# Patient Record
Sex: FEMALE | Race: BLACK OR AFRICAN AMERICAN | Employment: FULL TIME | URBAN - METROPOLITAN AREA
[De-identification: names, ages, dates, MRNs, and addresses within clinical notes are randomized per-mention and may not be internally consistent; named-entity substitution may affect disease eponyms.]

---

## 2017-01-09 RX ORDER — OMEPRAZOLE 20 MG/1
CAPSULE, DELAYED RELEASE ORAL
Qty: 180 CAP | Refills: 0 | Status: SHIPPED | OUTPATIENT
Start: 2017-01-09 | End: 2017-02-22 | Stop reason: SDUPTHER

## 2017-02-13 ENCOUNTER — OFFICE VISIT (OUTPATIENT)
Dept: INTERNAL MEDICINE CLINIC | Age: 42
End: 2017-02-13

## 2017-02-13 ENCOUNTER — TELEPHONE (OUTPATIENT)
Dept: INTERNAL MEDICINE CLINIC | Age: 42
End: 2017-02-13

## 2017-02-13 VITALS
RESPIRATION RATE: 16 BRPM | HEIGHT: 63 IN | DIASTOLIC BLOOD PRESSURE: 68 MMHG | OXYGEN SATURATION: 98 % | TEMPERATURE: 98.6 F | BODY MASS INDEX: 46.49 KG/M2 | SYSTOLIC BLOOD PRESSURE: 122 MMHG | HEART RATE: 84 BPM | WEIGHT: 262.4 LBS

## 2017-02-13 DIAGNOSIS — E11.9 TYPE 2 DIABETES MELLITUS WITHOUT COMPLICATION, UNSPECIFIED LONG TERM INSULIN USE STATUS: Primary | ICD-10-CM

## 2017-02-13 DIAGNOSIS — Z76.89 ENCOUNTER TO ESTABLISH CARE: ICD-10-CM

## 2017-02-13 DIAGNOSIS — E66.01 MORBID OBESITY, UNSPECIFIED OBESITY TYPE (HCC): ICD-10-CM

## 2017-02-13 NOTE — PROGRESS NOTES
Nando Ulloa is a  37 y.o. female presents for visit. Chief Complaint   Patient presents with    New Patient     need A1 c checked and has pain in tooth and cannot sleep, seeing a dentist on Wednesday if A1c is checked. HPI  Previously followed by Dr. Jasmyne Kinney and here to establish care with new pcp. Appointment with Oral surgeon on Wednesday for dental implant and needs HgA1c before the procedure. Intermittent dental pain pain. Otherwise no complaints. Starting clindamycin today. Review of Systems   Constitutional: Negative for chills and fever. Cardiovascular: Negative for chest pain and palpitations. Gastrointestinal: Negative for nausea. Patient Active Problem List    Diagnosis Date Noted    Diabetes ([facility]     GERD (gastroesophageal reflux disease)     High cholesterol     Plantar fasciitis      Past Medical History   Diagnosis Date    Diabetes ([facility]     GERD (gastroesophageal reflux disease)     High cholesterol     Plantar fasciitis      bilateral feet    S/P laparoscopic sleeve gastrectomy 2013      Past Surgical History   Procedure Laterality Date    Hx cholecystectomy  1998    Hx tonsillectomy  2005        Social History   Substance Use Topics    Smoking status: Never Smoker    Smokeless tobacco: Never Used    Alcohol use No      Social History     Social History Narrative     Family History   Problem Relation Age of Onset    Other Mother      septic    Diabetes Mother     Hypertension Mother     Heart Disease Mother     Diabetes Father     Heart Disease Father     Hypertension Father     Other Father      PVD    Cancer Paternal Aunt      OVARIAN    Cancer Paternal Aunt      COLON      Prior to Admission medications    Medication Sig Start Date End Date Taking?  Authorizing Provider   omeprazole (PRILOSEC) 20 mg capsule TAKE ONE CAPSULE BY MOUTH TWICE DAILY 1/9/17  Yes Carolyn MD Sagrario   metFORMIN ER (GLUCOPHAGE XR) 500 mg tablet Take 1 Tab by mouth daily (with dinner). 3/9/16  Yes Merrill Lynn MD      Allergies   Allergen Reactions    Pcn [Penicillins] Hives          Visit Vitals    /68 (BP 1 Location: Left arm, BP Patient Position: Sitting)    Pulse 84    Temp 98.6 °F (37 °C) (Oral)    Resp 16    Ht 5' 3\" (1.6 m)    Wt 262 lb 6.4 oz (119 kg)    SpO2 98%    BMI 46.48 kg/m2     Physical Exam   Constitutional: She is oriented to person, place, and time. She appears well-developed and well-nourished. HENT:   Head: Normocephalic and atraumatic. Mouth/Throat:       Right lateral incisor broken. No signs of infection. Cardiovascular: Normal rate, regular rhythm and normal heart sounds. Pulmonary/Chest: Effort normal and breath sounds normal.   Abdominal: Soft. Neurological: She is alert and oriented to person, place, and time. Skin: Skin is warm and dry. Psychiatric: She has a normal mood and affect. Her behavior is normal.   Vitals reviewed. This note will not be viewable in [address] ASSESSMENT AND PLAN:      ICD-10-CM ICD-9-CM    1. Type 2 diabetes mellitus without complication, unspecified long term insulin use status ([facility]) E11.9 250.00 HEMOGLOBIN A1C WITH EAG   2. Encounter to establish care Z76.89     3. Morbid obesity, unspecified obesity type ([facility] E66.01 278.01            Follow-up Disposition:  Return in about 2 weeks (around 2/27/2017), for FULL Phyisal Exam, Health Maintenance. Disclaimer:  Advised her to call back or return to office if symptoms worsen/change/persist.    She was given an after visit summary which includes diagnoses, current medications, & vitals. Discussed patient instructions and advised to read to all patient instructions regarding care. She expressed understanding with the diagnosis and plan.

## 2017-02-13 NOTE — PROGRESS NOTES
Chief Complaint   Patient presents with    New Patient     need A1 c checked and has pain in tooth and cannot sleep, seeing a dentist on Wednesday if A1c is checked.       Patient needs a1c as she is scheduled to have surgery for the tooth and needs the information for the dentist.  Dr Js Marcos, [phone] phone number

## 2017-02-13 NOTE — TELEPHONE ENCOUNTER
Spoke with pt earlier today after reviewing her chart prior to her appt. She was placed as a Ha1c check but she has never been seen by our office. She is a pt of Dr. Ahmet Salgado and [facility]. Called pt to see if she was planning on switching practices. After speaking with pt she stated she has no intention of switching offices, she stated that Dr. Ahmet Salgado office was unalbe to help her as she need a HA1C checked asap, she has emergent dental work on Wed and they will not do without blood work. I see no notes from office but pt stated they can not do blood work until she is seen. The first visit they could give her was the 16 of this month but she is to see dentist on 15. This is when she called our office. Called two times to Dr. Ahmet Salgado office,  Was unable to get someone on phone first call and second call left message with nurse to return call as we were unable to get her on the phone.  aware along with NP who is to see pt today. Will await call back from office but if no call back we will see pt. This writer f/u with pt with plan of care.

## 2017-02-14 LAB
EST. AVERAGE GLUCOSE BLD GHB EST-MCNC: 146 MG/DL
HBA1C MFR BLD: 6.7 % (ref 4.8–5.6)

## 2017-02-14 NOTE — PROGRESS NOTES
Hemoglobin A1c 6.7 stable since last year. Please call with questions or can discuss at appointment on 2/28/17. Left voicemail. Please call again. Patient needs result before dental procedure tomorrow.

## 2017-02-22 RX ORDER — OMEPRAZOLE 20 MG/1
CAPSULE, DELAYED RELEASE ORAL
Qty: 180 CAP | Refills: 0 | Status: SHIPPED | OUTPATIENT
Start: 2017-02-22 | End: 2017-04-25 | Stop reason: SDUPTHER

## 2017-04-25 ENCOUNTER — OFFICE VISIT (OUTPATIENT)
Dept: INTERNAL MEDICINE CLINIC | Age: 42
End: 2017-04-25

## 2017-04-25 VITALS
RESPIRATION RATE: 17 BRPM | HEIGHT: 63 IN | SYSTOLIC BLOOD PRESSURE: 115 MMHG | DIASTOLIC BLOOD PRESSURE: 69 MMHG | OXYGEN SATURATION: 99 % | TEMPERATURE: 97.4 F | HEART RATE: 69 BPM

## 2017-04-25 DIAGNOSIS — K21.9 GASTROESOPHAGEAL REFLUX DISEASE WITHOUT ESOPHAGITIS: ICD-10-CM

## 2017-04-25 DIAGNOSIS — E78.00 HIGH CHOLESTEROL: ICD-10-CM

## 2017-04-25 DIAGNOSIS — E11.9 TYPE 2 DIABETES MELLITUS WITHOUT COMPLICATION, UNSPECIFIED LONG TERM INSULIN USE STATUS: Primary | ICD-10-CM

## 2017-04-25 RX ORDER — CEPHALEXIN 250 MG/1
CAPSULE ORAL
COMMUNITY
Start: 2017-04-20 | End: 2017-11-13 | Stop reason: ALTCHOICE

## 2017-04-25 RX ORDER — CLINDAMYCIN HYDROCHLORIDE 150 MG/1
CAPSULE ORAL
COMMUNITY
Start: 2017-02-13 | End: 2017-04-25 | Stop reason: ALTCHOICE

## 2017-04-25 RX ORDER — OMEPRAZOLE 20 MG/1
20 CAPSULE, DELAYED RELEASE ORAL DAILY
Qty: 180 CAP | Refills: 0 | Status: SHIPPED | OUTPATIENT
Start: 2017-04-25 | End: 2017-04-28 | Stop reason: SDUPTHER

## 2017-04-25 RX ORDER — OXYCODONE AND ACETAMINOPHEN 5; 325 MG/1; MG/1
TABLET ORAL
Refills: 0 | COMMUNITY
Start: 2017-02-15 | End: 2018-01-16 | Stop reason: ALTCHOICE

## 2017-04-25 RX ORDER — METFORMIN HYDROCHLORIDE 500 MG/1
500 TABLET, EXTENDED RELEASE ORAL
Qty: 30 TAB | Refills: 11 | Status: SHIPPED | OUTPATIENT
Start: 2017-04-25

## 2017-04-25 RX ORDER — SULFAMETHOXAZOLE AND TRIMETHOPRIM 800; 160 MG/1; MG/1
TABLET ORAL
COMMUNITY
Start: 2017-04-20 | End: 2017-11-13 | Stop reason: ALTCHOICE

## 2017-04-25 NOTE — PROGRESS NOTES
SPORTS MEDICINE AND PRIMARY CARE  Blanca Joyce MD, [address] [address]  Phone:  [phone]  Fax: [phone]      Chief Complaint   Patient presents with    Physical     yearly exam          SUBECTIVE:    Jose Johns is a 37 y.o. female Patient returns today ambulatory, alert and appropriate and has the capacity to give an accurate history. She has a known history of diabetes, GERD, dyslipidemia and is seen for evaluation. Since we last saw her she was seen by Nurse Practitioner Snehal Ugarte on 2/13/17. Patient returns today stating that since we last saw her she was seen at [facility] for an abscess on her upper inner thigh which was I&D by [facility] and the packing has been removed. Other new complaints are denied except she states \"I am fat. \"          Current Outpatient Prescriptions   Medication Sig Dispense Refill    cephALEXin (KEFLEX) 250 mg capsule       trimethoprim-sulfamethoxazole (BACTRIM DS, SEPTRA DS) 160-800 mg per tablet       omeprazole (PRILOSEC) 20 mg capsule TAKE ONE CAPSULE BY MOUTH TWICE DAILY 180 Cap 0    metFORMIN ER (GLUCOPHAGE XR) 500 mg tablet Take 1 Tab by mouth daily (with dinner). 30 Tab 11    oxyCODONE-acetaminophen (PERCOCET) 5-325 mg per tablet TAKE 1 TO 2 TABLETS BY MOUTH EVERY 4 TO 6 HOURS AS NEEDED FOR PAIN  0     Past Medical History:   Diagnosis Date    Diabetes ([address]     GERD (gastroesophageal reflux disease)     High cholesterol     Plantar fasciitis     bilateral feet    S/P laparoscopic sleeve gastrectomy 2013     Past Surgical History:   Procedure Laterality Date    HX CHOLECYSTECTOMY  1998    HX TONSILLECTOMY  2005     Allergies   Allergen Reactions    Pcn [Penicillins] Hives       REVIEW OF SYSTEMS:   No chest pain. No shortness of breath.          Social History     Social History    Marital status: SINGLE     Spouse name: N/A    Number of children: N/A    Years of education: N/A     Social History Main Topics    Smoking status: Never Smoker    Smokeless tobacco: Never Used    Alcohol use No    Drug use: None    Sexual activity: Not Asked     Other Topics Concern    None     Social History Narrative    Habits: Does not smoke or drink. Does not do drugs.          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 She has two children, 21 and 17. Patient is .  is unfortunately incarcerated. The children are with her.         Family History: Father is 61 with diabetes and hypertension. Mother  at age 64 with two heart attacks, diabetes, and hypertension. Reason for death however was sepsis and multifocal pneumonia. She has five siblings, alive and well.   r  Family History   Problem Relation Age of Onset    Other Mother      septic    Diabetes Mother     Hypertension Mother     Heart Disease Mother     Diabetes Father     Heart Disease Father     Hypertension Father     Other Father      PVD    Cancer Paternal Aunt      OVARIAN    Cancer Paternal Aunt      COLON       OBJECTIVE:  Visit Vitals    /69 (BP 1 Location: Left arm, BP Patient Position: Sitting)    Pulse 69    Temp 97.4 °F (36.3 °C) (Oral)    Resp 17    Ht 5' 3\" (1.6 m)    SpO2 99%     ENT: perrla,  eom intact  NECK: supple. Thyroid normal  CHEST: clear to ascultation and percussion   HEART: regular rate and rhythm  ABD: soft, bowel sounds active  EXTREMITIES: no edema, pulse 1+Foot exam is unremarkable. No lesions. Sensation is intact to fine filament. Pulses are intact. Office Visit on 2017   Component Date Value Ref Range Status    Hemoglobin A1c 2017 6.7* 4.8 - 5.6 % Final    Comment:          Pre-diabetes: 5.7 - 6.4           Diabetes: >6.4           Glycemic control for adults with diabetes: <7.0      Estimated average glucose 2017 146  mg/dL Final          ASSESSMENT:  1.  Type 2 diabetes mellitus without complication, unspecified long term insulin use status    2. Gastroesophageal reflux disease without esophagitis    3. High cholesterol      Patient's medical status is generally stable. Blood pressure control is excellent and exactly where it should be. We are a little disappointed that she was obviously when she first saw us today that her BMI represents a ten pound weight gain since we last saw her. She plans actively to attack the food intake. She is aware that stressors can aggravate this issue. We will check her thyroid today. We reinforce to her that she has no thyroid studies although on exam her thyroid is at the upper limits of normal on palpation. She has an elliptical at home. She is working at home. We certainly encourage her to do some type of physical activity for 30-45 minutes five days a week and she agrees to do that. She will return to see us in about six months. We will send her the results of our studies. PLAN:  .  Orders Placed This Encounter    MICROALBUMIN, UR, RAND W/ MICROALBUMIN/CREA RATIO    LIPID PANEL    CBC WITH AUTOMATED DIFF    METABOLIC PANEL, COMPREHENSIVE    URINALYSIS W/ RFLX MICROSCOPIC    TSH 3RD GENERATION    HEMOGLOBIN A1C WITH EAG    REFERRAL TO OPHTHALMOLOGY    cephALEXin (KEFLEX) 250 mg capsule    DISCONTD: clindamycin (CLEOCIN) 150 mg capsule    oxyCODONE-acetaminophen (PERCOCET) 5-325 mg per tablet    trimethoprim-sulfamethoxazole (BACTRIM DS, SEPTRA DS) 160-800 mg per tablet       Follow-up Disposition:  Return in about 6 months (around 10/25/2017). ATTENTION:   This medical record was transcribed using an electronic medical records system. Although proofread, it may and can contain electronic and spelling errors. Other human spelling and other errors may be present. Corrections may be executed at a later time. Please feel free to contact us for any clarifications as needed.

## 2017-04-25 NOTE — MR AVS SNAPSHOT
Visit Information Date & Time Provider Department Dept. Phone Encounter #  
 4/25/2017  9:00 AM [facility] [facility] [phone] [id number] Follow-up Instructions Return in about 6 months (around 10/25/2017). Follow-up and Disposition History Upcoming Health Maintenance Date Due  
 FOOT EXAM Q1 5/29/1986 EYE EXAM RETINAL OR DILATED Q1 5/29/1986 Pneumococcal 19-64 Medium Risk (1 of 1 - PPSV23) 5/29/1995 DTaP/Tdap/Td series (1 - Tdap) 5/29/1997 INFLUENZA AGE 9 TO ADULT 8/1/2016 LIPID PANEL Q1 9/11/2016 MICROALBUMIN Q1 2/28/2017 HEMOGLOBIN A1C Q6M 8/13/2017 PAP AKA CERVICAL CYTOLOGY 2/28/2019 Allergies as of 4/25/2017  Review Complete On: 4/25/2017 By: Brielle Iniguez MD  
  
 Severity Noted Reaction Type Reactions Pcn [Penicillins]  04/23/2013    Hives Current Immunizations  Reviewed on 5/7/2013 Name Date Influenza Vaccine  Deferred (Patient Refused) Pneumococcal Polysaccharide (PPSV-23)  Deferred (Patient Refused) Not reviewed this visit You Were Diagnosed With   
  
 Codes Comments Type 2 diabetes mellitus without complication, unspecified long term insulin use status    -  Primary ICD-10-CM: E11.9 ICD-9-CM: 250.00 Gastroesophageal reflux disease without esophagitis     ICD-10-CM: K21.9 ICD-9-CM: 530.81 High cholesterol     ICD-10-CM: E78.00 ICD-9-CM: 272.0 Vitals BP Pulse Temp Resp Height(growth percentile) SpO2  
 115/69 (BP 1 Location: Left arm, BP Patient Position: Sitting) 69 97.4 °F (36.3 °C) (Oral) 17 5' 3\" (1.6 m) 99% OB Status Smoking Status Chemically Induced Never Smoker Preferred Pharmacy Pharmacy Name Phone [place] [phone] Your Updated Medication List  
  
   
This list is accurate as of: 4/25/17 10:27 AM.  Always use your most recent med list.  
  
  
  
  
 cephALEXin 250 mg capsule Commonly known as:  KEFLEX  
  
 metFORMIN  mg tablet Commonly known as:  GLUCOPHAGE XR Take 1 Tab by mouth daily (with dinner). omeprazole 20 mg capsule Commonly known as:  PRILOSEC  
TAKE ONE CAPSULE BY MOUTH TWICE DAILY  
  
 oxyCODONE-acetaminophen 5-325 mg per tablet Commonly known as:  PERCOCET TAKE 1 TO 2 TABLETS BY MOUTH EVERY 4 TO 6 HOURS AS NEEDED FOR PAIN  
  
 trimethoprim-sulfamethoxazole 160-800 mg per tablet Commonly known as:  BACTRIM DS, SEPTRA DS We Performed the Following CBC WITH AUTOMATED DIFF [80786 CPT(R)] HEMOGLOBIN A1C WITH EAG [36377 CPT(R)]  DIABETES FOOT EXAM [HM7 Custom] LIPID PANEL [51714 CPT(R)] METABOLIC PANEL, COMPREHENSIVE [82267 CPT(R)] MICROALBUMIN, UR, RAND W/ MICROALBUMIN/CREA RATIO [id number] CPT(R)] OK COLLECTION VENOUS BLOOD,VENIPUNCTURE [id number] CPT(R)] REFERRAL TO OPHTHALMOLOGY [REF57 Custom] TSH 3RD GENERATION [06563 CPT(R)] URINALYSIS W/ RFLX MICROSCOPIC [83750 CPT(R)] Follow-up Instructions Return in about 6 months (around 10/25/2017). Referral Information Referral ID Referred By Referred To  
  
 [id number] Melody ROMAN Not Available Visits Status Start Date End Date 1 New Request 4/25/17 4/25/18 If your referral has a status of pending review or denied, additional information will be sent to support the outcome of this decision. Introducing [facility]! Dear Norman Coleman: Thank you for requesting a [employer] account. Our records indicate that you already have an active [employer] account. You can access your account anytime at [email or web address] Did you know that you can access your hospital and ER discharge instructions at any time in [employer]? You can also review all of your test results from your hospital stay or ER visit. Additional Information If you have questions, please visit the Frequently Asked Questions section of the [employer] website at [email or web address] Remember, [employer] is NOT to be used for urgent needs. For medical emergencies, dial 911. Now available from your iPhone and Android! Please provide this summary of care documentation to your next provider. Your [facility] If you have any questions after today's visit, please call [phone].

## 2017-04-25 NOTE — PROGRESS NOTES
1. Have you been to the ER, urgent care clinic since your last visit? Hospitalized since your last visit? Yes Where: [facility]     2. Have you seen or consulted any other health care providers outside of the [address] [place] [address] since your last visit? Include any pap smears or colon screening.  Yes Reason for visit: Boil on inner right thigh

## 2017-04-26 LAB
ALBUMIN SERPL-MCNC: 3.8 G/DL (ref 3.5–5.5)
ALBUMIN/CREAT UR: 2 MG/G CREAT (ref 0–30)
ALBUMIN/GLOB SERPL: 1.2 {RATIO} (ref 1.2–2.2)
ALP SERPL-CCNC: 103 IU/L (ref 39–117)
ALT SERPL-CCNC: 11 IU/L (ref 0–32)
APPEARANCE UR: CLEAR
AST SERPL-CCNC: 12 IU/L (ref 0–40)
BASOPHILS # BLD AUTO: 0 X10E3/UL (ref 0–0.2)
BASOPHILS NFR BLD AUTO: 0 %
BILIRUB SERPL-MCNC: 0.2 MG/DL (ref 0–1.2)
BILIRUB UR QL STRIP: NEGATIVE
BUN SERPL-MCNC: 9 MG/DL (ref 6–24)
BUN/CREAT SERPL: 13 (ref 9–23)
CALCIUM SERPL-MCNC: 8.9 MG/DL (ref 8.7–10.2)
CHLORIDE SERPL-SCNC: 100 MMOL/L (ref 96–106)
CHOLEST SERPL-MCNC: 153 MG/DL (ref 100–199)
CO2 SERPL-SCNC: 22 MMOL/L (ref 18–29)
COLOR UR: YELLOW
CREAT SERPL-MCNC: 0.71 MG/DL (ref 0.57–1)
CREAT UR-MCNC: 202 MG/DL
EOSINOPHIL # BLD AUTO: 0.2 X10E3/UL (ref 0–0.4)
EOSINOPHIL NFR BLD AUTO: 2 %
ERYTHROCYTE [DISTWIDTH] IN BLOOD BY AUTOMATED COUNT: 19.7 % (ref 12.3–15.4)
EST. AVERAGE GLUCOSE BLD GHB EST-MCNC: 148 MG/DL
GLOBULIN SER CALC-MCNC: 3.2 G/DL (ref 1.5–4.5)
GLUCOSE SERPL-MCNC: 95 MG/DL (ref 65–99)
GLUCOSE UR QL: NEGATIVE
HBA1C MFR BLD: 6.8 % (ref 4.8–5.6)
HCT VFR BLD AUTO: 33.9 % (ref 34–46.6)
HDLC SERPL-MCNC: 39 MG/DL
HGB BLD-MCNC: 10.2 G/DL (ref 11.1–15.9)
HGB UR QL STRIP: NEGATIVE
IMM GRANULOCYTES # BLD: 0 X10E3/UL (ref 0–0.1)
IMM GRANULOCYTES NFR BLD: 0 %
KETONES UR QL STRIP: NEGATIVE
LDLC SERPL CALC-MCNC: 104 MG/DL (ref 0–99)
LEUKOCYTE ESTERASE UR QL STRIP: NEGATIVE
LYMPHOCYTES # BLD AUTO: 2.5 X10E3/UL (ref 0.7–3.1)
LYMPHOCYTES NFR BLD AUTO: 26 %
MCH RBC QN AUTO: 21.2 PG (ref 26.6–33)
MCHC RBC AUTO-ENTMCNC: 30.1 G/DL (ref 31.5–35.7)
MCV RBC AUTO: 71 FL (ref 79–97)
MICRO URNS: NORMAL
MICROALBUMIN UR-MCNC: 4 UG/ML
MONOCYTES # BLD AUTO: 0.6 X10E3/UL (ref 0.1–0.9)
MONOCYTES NFR BLD AUTO: 6 %
NEUTROPHILS # BLD AUTO: 6.3 X10E3/UL (ref 1.4–7)
NEUTROPHILS NFR BLD AUTO: 66 %
NITRITE UR QL STRIP: NEGATIVE
PH UR STRIP: 6 [PH] (ref 5–7.5)
PLATELET # BLD AUTO: 360 X10E3/UL (ref 150–379)
POTASSIUM SERPL-SCNC: 4.5 MMOL/L (ref 3.5–5.2)
PROT SERPL-MCNC: 7 G/DL (ref 6–8.5)
PROT UR QL STRIP: NEGATIVE
RBC # BLD AUTO: 4.81 X10E6/UL (ref 3.77–5.28)
SODIUM SERPL-SCNC: 137 MMOL/L (ref 134–144)
SP GR UR: 1.03 (ref 1–1.03)
TRIGL SERPL-MCNC: 49 MG/DL (ref 0–149)
TSH SERPL DL<=0.005 MIU/L-ACNC: 1.15 UIU/ML (ref 0.45–4.5)
UROBILINOGEN UR STRIP-MCNC: 1 MG/DL (ref 0.2–1)
VLDLC SERPL CALC-MCNC: 10 MG/DL (ref 5–40)
WBC # BLD AUTO: 9.6 X10E3/UL (ref 3.4–10.8)

## 2017-04-28 RX ORDER — OMEPRAZOLE 20 MG/1
CAPSULE, DELAYED RELEASE ORAL
Qty: 180 CAP | Refills: 0 | Status: SHIPPED | OUTPATIENT
Start: 2017-04-28 | End: 2017-09-08 | Stop reason: SDUPTHER

## 2017-09-08 RX ORDER — OMEPRAZOLE 20 MG/1
CAPSULE, DELAYED RELEASE ORAL
Qty: 180 CAP | Refills: 3 | Status: SHIPPED | OUTPATIENT
Start: 2017-09-08 | End: 2018-09-24 | Stop reason: SDUPTHER

## 2017-11-13 ENCOUNTER — OFFICE VISIT (OUTPATIENT)
Dept: INTERNAL MEDICINE CLINIC | Age: 42
End: 2017-11-13

## 2017-11-13 VITALS
RESPIRATION RATE: 16 BRPM | BODY MASS INDEX: 46.21 KG/M2 | HEART RATE: 80 BPM | OXYGEN SATURATION: 97 % | DIASTOLIC BLOOD PRESSURE: 78 MMHG | SYSTOLIC BLOOD PRESSURE: 124 MMHG | WEIGHT: 260.8 LBS | TEMPERATURE: 99 F | HEIGHT: 63 IN

## 2017-11-13 DIAGNOSIS — G43.109 MIGRAINE WITH AURA AND WITHOUT STATUS MIGRAINOSUS, NOT INTRACTABLE: Primary | ICD-10-CM

## 2017-11-13 DIAGNOSIS — K21.9 GASTROESOPHAGEAL REFLUX DISEASE WITHOUT ESOPHAGITIS: ICD-10-CM

## 2017-11-13 RX ORDER — SUMATRIPTAN 100 MG/1
TABLET, FILM COATED ORAL
Qty: 10 TAB | Refills: 2 | Status: SHIPPED | OUTPATIENT
Start: 2017-11-13

## 2017-11-13 NOTE — PROGRESS NOTES
This note will not be viewable in [address] Renato Michelle is a  40 y.o. female presents for visit. Headache    Chief Complaint   Patient presents with    Migraine     started saturday night. front and on the left side of the head. denies nausea or any other symptoms. patient is having light sensitivity       HPI  Presents with recurrent headaches. Approximately 2-3 x per month. Some light sensitivity. States it varies in location, usually unilateral. Describes pain as throbbing. Pain level 8/10 without medications and about 3/10 with fioricet and ibuprofen. Duration about 3 days. Has not tried triptans in the past. States she is aware of an impending HA. Reports her saliva thickens. Review of Systems   Respiratory: Negative for sputum production. Cardiovascular: Positive for palpitations (occasional). Negative for chest pain. Gastrointestinal: Negative for nausea and vomiting. Neurological: Positive for headaches. Negative for dizziness. Visit Vitals    /78 (BP 1 Location: Left arm, BP Patient Position: Sitting)    Pulse 80    Temp 99 °F (37.2 °C) (Oral)    Resp 16    Ht 5' 3\" (1.6 m)    Wt 260 lb 12.8 oz (118.3 kg)    SpO2 97%    BMI 46.2 kg/m2     Physical Exam   Constitutional: She is oriented to person, place, and time. obese   HENT:   Head: Normocephalic and atraumatic. Eyes: Conjunctivae are normal.   Cardiovascular: Normal rate, regular rhythm and normal heart sounds. Pulmonary/Chest: Effort normal and breath sounds normal. She has no wheezes. Abdominal: Soft. Bowel sounds are normal.   Neurological: She is alert and oriented to person, place, and time. Skin: Skin is warm and dry. Psychiatric: She has a normal mood and affect. Her behavior is normal.   Nursing note and vitals reviewed. No results found for this or any previous visit (from the past 24 hour(s)).     Patient Active Problem List    Diagnosis Date Noted    Diabetes ([address]     GERD (gastroesophageal reflux disease)     High cholesterol     Plantar fasciitis          ASSESSMENT AND PLAN:      ICD-10-CM ICD-9-CM   1. Migraine with aura and without status migrainosus, not intractable G43.109 346.00   2. Gastroesophageal reflux disease without esophagitis K21.9 530.81   3. BMI 45.0-49.9, adult [facility]) Z68.42 V85.42     Orders Placed This Encounter    SUMAtriptan (IMITREX) 100 mg tablet     Sig: Take 1 tab at HA onset. May repeat x 1 dose in 2 hours if HA not resolved. Max dose is 200 mg in 24 hours. Indications: Migraine     Dispense:  10 Tab     Refill:  2     Diagnoses and all orders for this visit:    1. Migraine with aura and without status migrainosus, not intractable  -     SUMAtriptan (IMITREX) 100 mg tablet; Take 1 tab at HA onset. May repeat x 1 dose in 2 hours if HA not resolved. Max dose is 200 mg in 24 hours. Indications: Migraine   Bring HA diary. 2. Gastroesophageal reflux disease without esophagitis- Continue current tx.    3. BMI 45.0-49.9, adult ([address]        reviewed diet, exercise and weight control  Discussed with patient with whom she wants to follow as pcp. Complete physical with Dr. Victoriano Reaves several months ago. Requested she chooses 1 HCP. Patient stated [facility] Our office is 5 minutes from her house. Follow-up Disposition:  Return in about 3 months (around 2/13/2018), or if symptoms worsen or fail to improve, for headache. Disclaimer:  Advised her to call back or return to office if symptoms worsen/change/persist.  Discussed expected course/resolution/complications of diagnosis in detail with patient. Medication risks/benefits/alternatives discussed with patient. She was given an after visit summary which includes diagnoses, current medications, & vitals. Discussed patient instructions and advised to read to all patient instructions regarding care. She expressed understanding with the diagnosis and plan.

## 2017-11-13 NOTE — PROGRESS NOTES
Chief Complaint   Patient presents with    Migraine     started saturday night. front and on the left side of the head. denies nausea or any other symptoms.   patient is having light sensitivity

## 2018-01-16 ENCOUNTER — OFFICE VISIT (OUTPATIENT)
Dept: INTERNAL MEDICINE CLINIC | Age: 43
End: 2018-01-16

## 2018-01-16 VITALS
DIASTOLIC BLOOD PRESSURE: 84 MMHG | RESPIRATION RATE: 16 BRPM | SYSTOLIC BLOOD PRESSURE: 134 MMHG | HEIGHT: 63 IN | BODY MASS INDEX: 45.86 KG/M2 | HEART RATE: 75 BPM | TEMPERATURE: 98.7 F | OXYGEN SATURATION: 98 % | WEIGHT: 258.8 LBS

## 2018-01-16 DIAGNOSIS — E66.01 MORBIDLY OBESE (HCC): ICD-10-CM

## 2018-01-16 DIAGNOSIS — Z00.00 PHYSICAL EXAM: Primary | ICD-10-CM

## 2018-01-16 DIAGNOSIS — Z23 NEED FOR VACCINE FOR TD (TETANUS-DIPHTHERIA): ICD-10-CM

## 2018-01-16 DIAGNOSIS — Z98.84 S/P BARIATRIC SURGERY: ICD-10-CM

## 2018-01-16 DIAGNOSIS — E11.9 DIABETES MELLITUS TYPE 2, DIET-CONTROLLED (HCC): ICD-10-CM

## 2018-01-16 NOTE — MR AVS SNAPSHOT
[address] [facility] [address] [place] [address] [facility] 13 [facility] 
[phone] Patient: Eliseo Angeles MRN: [id number] [facility]:0/22/5448 Visit Information Date & Time Provider Department Dept. Phone Encounter #  
 1/16/2018 10:15 AM Gina Stoll MD [facility] Internal Medicine [phone] [id number] Upcoming Health Maintenance Date Due  
 EYE EXAM RETINAL OR DILATED Q1 5/29/1986 Pneumococcal 19-64 Medium Risk (1 of 1 - PPSV23) 5/29/1995 FOOT EXAM Q1 4/25/2018 MICROALBUMIN Q1 4/25/2018 LIPID PANEL Q1 4/25/2018 HEMOGLOBIN A1C Q6M 7/16/2018 PAP AKA CERVICAL CYTOLOGY 11/14/2020 DTaP/Tdap/Td series (2 - Td) 1/16/2028 Allergies as of 1/16/2018  Review Complete On: 1/16/2018 By: Ching Cisneros LPN Severity Noted Reaction Type Reactions Pcn [Penicillins]  04/23/2013    Hives Current Immunizations  Reviewed on 5/7/2013 Name Date Influenza Vaccine  Deferred (Patient Refused) Pneumococcal Polysaccharide (PPSV-23)  Deferred (Patient Refused) Not reviewed this visit You Were Diagnosed With   
  
 Codes Comments Physical exam    -  Primary ICD-10-CM: Z00.00 ICD-9-CM: V70.9 Diabetes mellitus type 2, diet-controlled ([facility]     ICD-10-CM: E11.9 ICD-9-CM: 250.00 S/P bariatric surgery     ICD-10-CM: Z98.84 ICD-9-CM: V45.86 Morbidly obese (HCC)     ICD-10-CM: E66.01 
ICD-9-CM: 278.01 Need for vaccine for TD (tetanus-diphtheria)     ICD-10-CM: Tawanna Maximus ICD-9-CM: V06.5 Vitals BP Pulse Temp Resp Height(growth percentile) Weight(growth percentile) 134/84 (BP 1 Location: Right arm, BP Patient Position: Sitting) 75 98.7 °F (37.1 °C) (Oral) 16 5' 3\" (1.6 m) 258 lb 12.8 oz (117.4 kg) SpO2 BMI OB Status Smoking Status 98% 45.84 kg/m2 IUD Never Smoker BMI and BSA Data Body Mass Index Body Surface Area 45.84 kg/m 2 2.28 m 2 Preferred Pharmacy Pharmacy Name Phone [facility] [phone] Your Updated Medication List  
  
   
This list is accurate as of: 18 11:01 AM.  Always use your most recent med list.  
  
  
  
  
 diph,Pertuss(Acell),Tet Vac-PF 2 Lf-(2.5-5-3-5 mcg)-5Lf/0.5 mL susp Commonly known as:  ADACEL  
0.5 mL by IntraMUSCular route once for 1 dose. metFORMIN  mg tablet Commonly known as:  GLUCOPHAGE XR Take 1 Tab by mouth daily (with dinner). omeprazole 20 mg capsule Commonly known as:  PRILOSEC  
TAKE ONE CAPSULE BY MOUTH TWICE DAILY  
  
 SUMAtriptan 100 mg tablet Commonly known as:  IMITREX Take 1 tab at HA onset. May repeat x 1 dose in 2 hours if HA not resolved. Max dose is 200 mg in 24 hours. Indications: Migraine Prescriptions Sent to Pharmacy Refills diph,Pertuss,Acell,,Tet Vac-PF (ADACEL) 2 Lf-(2.5-5-3-5 mcg)-5Lf/0.5 mL susp 0 Si.5 mL by IntraMUSCular route once for 1 dose. Class: Normal  
 Pharmacy: [address] [address]  #: [phone] Route: IntraMUSCular We Performed the Following CBC W/O DIFF [90173 CPT(R)] HEMOGLOBIN A1C WITH EAG [60137 CPT(R)] LIPID PANEL [06401 CPT(R)] METABOLIC PANEL, COMPREHENSIVE [03018 CPT(R)] TSH 3RD GENERATION [77345 CPT(R)] VITAMIN B12 [id number] CPT(R)] VITAMIN D, 25 HYDROXY [id number] CPT(R)] Introducing [facility] & HEALTH SERVICES! Dear Efrem Loges: Thank you for requesting a revoPT account. Our records indicate that you already have an active revoPT account. You can access your account anytime at [email or web address] Did you know that you can access your hospital and ER discharge instructions at any time in revoPT? You can also review all of your test results from your hospital stay or ER visit. Additional Information If you have questions, please visit the Frequently Asked Questions section of the [email or web address] website at [email or web address] Remember, AliveCor is NOT to be used for urgent needs. For medical emergencies, dial 911. Now available from your iPhone and Android! Please provide this summary of care documentation to your next provider. Your primary care clinician [facility]. If you have any questions after today's visit, please call [phone].

## 2018-01-16 NOTE — PROGRESS NOTES
Written by Paula Hinton, as dictated by Dr. Sandra Dan MD.    Margarette Sarabia is a 40 y.o. female. HPI  The patient comes in today for a complete physical examination. She has been feeling more distracted and inattentive than normal lately. 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 She works while she is in school and does not sleep very much. In 2013 she had a gastric sleeve surgery done with Dr. Salome Medellin (gen surg). She was close to 300 lbs at that time, and got down to about 230 lbs after the surgery. She has been slowly gaining weight lately, and is wondering if she should follow up with her surgeon to discuss this. She does not feel like she eats that much, but admits she likes to eat sweets and thinks she is not eating the right foods. She has not had a sleep study done. She was diabetic before her weight loss surgery but it resolved as she lost weight, and she was able to d/c metformin. However, her HA1c was 6.8% when checked in 04/2017 so she was restarted on metformin. She would like to have her HA1c rechecked today. She has seen an ophthalmologist once for a diabetic checkup, which was normal.    She has experienced palpitations in the past. She went to [facility] for these in the past and an EKG was done. She does not get flu shots. Her last Tdap was almost 10 years ago. Her last mammogram and Pap were in 11/2017 with Dr. Karla Larios. Patient Active Problem List   Diagnosis Code    [facility] E11.9    GERD (gastroesophageal reflux disease) K21.9    High cholesterol E78.00    Plantar fasciitis M72.2    S/P bariatric surgery Z98.84        Current Outpatient Prescriptions on File Prior to Visit   Medication Sig Dispense Refill    omeprazole (PRILOSEC) 20 mg capsule TAKE ONE CAPSULE BY MOUTH TWICE DAILY 180 Cap 3    metFORMIN ER (GLUCOPHAGE XR) 500 mg tablet Take 1 Tab by mouth daily (with dinner).  30 Tab 11    SUMAtriptan (IMITREX) 100 mg tablet Take 1 tab at HA onset. May repeat x 1 dose in 2 hours if HA not resolved. Max dose is 200 mg in 24 hours. Indications: Migraine 10 Tab 2    oxyCODONE-acetaminophen (PERCOCET) 5-325 mg per tablet TAKE 1 TO 2 TABLETS BY MOUTH EVERY 4 TO 6 HOURS AS NEEDED FOR PAIN  0     No current facility-administered medications on file prior to visit. Allergies   Allergen Reactions    Pcn [Penicillins] Hives       Past Medical History:   Diagnosis Date    [facility] ([facility]     GERD (gastroesophageal reflux disease)     High cholesterol     Plantar fasciitis     bilateral feet    S/P laparoscopic sleeve gastrectomy        Past Surgical History:   Procedure Laterality Date    HX CHOLECYSTECTOMY      HX TONSILLECTOMY         Family History   Problem Relation Age of Onset    Other Mother      septic    Diabetes Mother     Hypertension Mother     Heart Disease Mother     Diabetes Father     Heart Disease Father     Hypertension Father     Other Father      PVD    Cancer Paternal Aunt      OVARIAN    Cancer Paternal Aunt      COLON       Social History     Social History    Marital status: SINGLE     Spouse name: N/A    Number of children: N/A    Years of education: N/A     Occupational History    Not on file. Social History Main Topics    Smoking status: Never Smoker    Smokeless tobacco: Never Used    Alcohol use No    Drug use: Not on file    Sexual activity: Not on file     Other Topics Concern    Not on file     Social History Narrative    Habits: Does not smoke or drink. Does not do drugs.          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 She has two children, 21 and 17. Patient is .  is unfortunately incarcerated. The children are with her.         Family History: Father is 61 with diabetes and hypertension.  Mother  at age 64 with two heart attacks, diabetes, and hypertension. Reason for death however was sepsis and multifocal pneumonia. She has five siblings, alive and well. Review of Systems   Constitutional: Positive for malaise/fatigue. HENT: Negative for congestion. Eyes: Negative for blurred vision and pain. Respiratory: Negative for cough and shortness of breath. Cardiovascular: Positive for palpitations. Negative for chest pain. Gastrointestinal: Negative for abdominal pain and heartburn. Genitourinary: Negative for frequency and urgency. Musculoskeletal: Negative for joint pain and myalgias. Neurological: Negative for dizziness, tingling, sensory change, weakness and headaches. Psychiatric/Behavioral: Negative for depression, memory loss and substance abuse. Visit Vitals    /84 (BP 1 Location: Right arm, BP Patient Position: Sitting)    Pulse 75    Temp 98.7 °F (37.1 °C) (Oral)    Resp 16    Ht 5' 3\" (1.6 m)    Wt 258 lb 12.8 oz (117.4 kg)    SpO2 98%    BMI 45.84 kg/m2       Physical Exam   Constitutional: She is oriented to person, place, and time. She appears well-developed. No distress. Morbidly obese   HENT:   Right Ear: External ear normal.   Left Ear: External ear normal.   Eyes: Conjunctivae and EOM are normal. Right eye exhibits no discharge. Left eye exhibits no discharge. Neck: Normal range of motion. Neck supple. Cardiovascular: Normal rate and regular rhythm. Pulmonary/Chest: Effort normal and breath sounds normal. She has no wheezes. Abdominal: Soft. Bowel sounds are normal. There is no tenderness. Musculoskeletal:   R leg crepitus   Lymphadenopathy:     She has no cervical adenopathy. Neurological: She is alert and oriented to person, place, and time. Reflex Scores:       Patellar reflexes are 2+ on the right side and 0 on the left side. Skin: She is not diaphoretic. Psychiatric: She has a normal mood and affect. Her behavior is normal.   Nursing note and vitals reviewed.       ASSESSMENT and PLAN    ICD-10-CM ICD-9-CM    1. Physical exam Z00.00 V70.9 LIPID PANEL      TSH 3RD GENERATION      CBC W/O DIFF      METABOLIC PANEL, COMPREHENSIVE    Complete physical exam done. Pt will return during lab hours to have basic fasting labs drawn. 2. Diabetes mellitus type 2, diet-controlled (HCC) E11.9 250.00 HEMOGLOBIN A1C WITH EAG    Compliant on metformin. . Will recheck HA1c today. 3. S/P bariatric surgery Z98.84 V45.86 VITAMIN B12      VITAMIN D, 25 HYDROXY    Will recheck vitamin D and B12 today. B12 deficiency could cause fatigue. 4. Morbidly obese ([facility] E66.01 278.01 Will wait for lab results to get back. If normal, she can RTC and we can discuss starting on medication. 5. Need for vaccine for TD (tetanus-diphtheria) Z23 V06.5 diph,Pertuss,Acell,,Tet Vac-PF (ADACEL) 2 Lf-(2.5-5-3-5 mcg)-5Lf/0.5 mL susp sent to pharmacy    Tdap ordered. This plan was reviewed with the patient and patient agrees. All questions were answered. This scribe documentation was reviewed by me and accurately reflects the examination and decisions made by me.

## 2018-01-18 LAB
25(OH)D3+25(OH)D2 SERPL-MCNC: 18.4 NG/ML (ref 30–100)
ALBUMIN SERPL-MCNC: 3.7 G/DL (ref 3.5–5.5)
ALBUMIN/GLOB SERPL: 1.2 {RATIO} (ref 1.2–2.2)
ALP SERPL-CCNC: 86 IU/L (ref 39–117)
ALT SERPL-CCNC: 8 IU/L (ref 0–32)
AST SERPL-CCNC: 10 IU/L (ref 0–40)
BILIRUB SERPL-MCNC: 0.4 MG/DL (ref 0–1.2)
BUN SERPL-MCNC: 10 MG/DL (ref 6–24)
BUN/CREAT SERPL: 14 (ref 9–23)
CALCIUM SERPL-MCNC: 8.8 MG/DL (ref 8.7–10.2)
CHLORIDE SERPL-SCNC: 104 MMOL/L (ref 96–106)
CHOLEST SERPL-MCNC: 168 MG/DL (ref 100–199)
CO2 SERPL-SCNC: 21 MMOL/L (ref 18–29)
CREAT SERPL-MCNC: 0.7 MG/DL (ref 0.57–1)
ERYTHROCYTE [DISTWIDTH] IN BLOOD BY AUTOMATED COUNT: 18.5 % (ref 12.3–15.4)
EST. AVERAGE GLUCOSE BLD GHB EST-MCNC: 151 MG/DL
GLOBULIN SER CALC-MCNC: 3.1 G/DL (ref 1.5–4.5)
GLUCOSE SERPL-MCNC: 125 MG/DL (ref 65–99)
HBA1C MFR BLD: 6.9 % (ref 4.8–5.6)
HCT VFR BLD AUTO: 32.4 % (ref 34–46.6)
HDLC SERPL-MCNC: 38 MG/DL
HGB BLD-MCNC: 9.7 G/DL (ref 11.1–15.9)
INTERPRETATION, 910389: NORMAL
LDLC SERPL CALC-MCNC: 118 MG/DL (ref 0–99)
Lab: NORMAL
MCH RBC QN AUTO: 20.9 PG (ref 26.6–33)
MCHC RBC AUTO-ENTMCNC: 29.9 G/DL (ref 31.5–35.7)
MCV RBC AUTO: 70 FL (ref 79–97)
PLATELET # BLD AUTO: 365 X10E3/UL (ref 150–379)
POTASSIUM SERPL-SCNC: 4.2 MMOL/L (ref 3.5–5.2)
PROT SERPL-MCNC: 6.8 G/DL (ref 6–8.5)
RBC # BLD AUTO: 4.65 X10E6/UL (ref 3.77–5.28)
SODIUM SERPL-SCNC: 139 MMOL/L (ref 134–144)
TRIGL SERPL-MCNC: 58 MG/DL (ref 0–149)
TSH SERPL DL<=0.005 MIU/L-ACNC: 1.78 UIU/ML (ref 0.45–4.5)
VIT B12 SERPL-MCNC: 385 PG/ML (ref 232–1245)
VLDLC SERPL CALC-MCNC: 12 MG/DL (ref 5–40)
WBC # BLD AUTO: 9.2 X10E3/UL (ref 3.4–10.8)

## 2018-01-23 ENCOUNTER — TELEPHONE (OUTPATIENT)
Dept: INTERNAL MEDICINE CLINIC | Age: 43
End: 2018-01-23

## 2018-01-23 ENCOUNTER — OFFICE VISIT (OUTPATIENT)
Dept: INTERNAL MEDICINE CLINIC | Age: 43
End: 2018-01-23

## 2018-01-23 VITALS
RESPIRATION RATE: 16 BRPM | HEIGHT: 63 IN | OXYGEN SATURATION: 98 % | DIASTOLIC BLOOD PRESSURE: 82 MMHG | TEMPERATURE: 99.4 F | BODY MASS INDEX: 46.21 KG/M2 | SYSTOLIC BLOOD PRESSURE: 150 MMHG | WEIGHT: 260.8 LBS | HEART RATE: 103 BPM

## 2018-01-23 DIAGNOSIS — R03.0 ELEVATED BLOOD PRESSURE READING WITHOUT DIAGNOSIS OF HYPERTENSION: ICD-10-CM

## 2018-01-23 DIAGNOSIS — D50.8 OTHER IRON DEFICIENCY ANEMIA: ICD-10-CM

## 2018-01-23 DIAGNOSIS — J20.9 BRONCHITIS, ACUTE, WITH BRONCHOSPASM: Primary | ICD-10-CM

## 2018-01-23 DIAGNOSIS — R06.2 WHEEZING ON AUSCULTATION: ICD-10-CM

## 2018-01-23 RX ORDER — AZITHROMYCIN 250 MG/1
250 TABLET, FILM COATED ORAL SEE ADMIN INSTRUCTIONS
Qty: 6 TAB | Refills: 0 | Status: SHIPPED | OUTPATIENT
Start: 2018-01-23 | End: 2018-01-28

## 2018-01-23 RX ORDER — METHYLPREDNISOLONE 4 MG/1
TABLET ORAL
Qty: 1 DOSE PACK | Refills: 0 | Status: SHIPPED | OUTPATIENT
Start: 2018-01-23

## 2018-01-23 RX ORDER — ALBUTEROL SULFATE 90 UG/1
1 AEROSOL, METERED RESPIRATORY (INHALATION)
Qty: 1 INHALER | Refills: 0 | Status: SHIPPED | OUTPATIENT
Start: 2018-01-23 | End: 2018-02-22

## 2018-01-23 NOTE — PROGRESS NOTES
Chief Complaint   Patient presents with    Follow-up     for lab results. states that she has been having cold symptoms for 5 days, went thru [facility] virtual visit and was ordered albuterol but insurance will not pay for brand would like to have new order put in.

## 2018-01-23 NOTE — PROGRESS NOTES
Written by Ranjana Fuchs, as dictated by Dr. Troy Sierra MD.    Gabi Nava is a 40 y.o. female. HPI  The patient comes in today to discuss labs, drawn on 01/17. Her vitamin D was low at 18.4, and she is not taking supplements at this time. Her B12 was low-normal at 385. Her BS was 125 and HA1c was 6.9%, up from 6.8% in 04/2017. Her Hb was low at 9.7, HCT low at 32.4. She denies heavy periods as she has a Mirena IUD, or blood in her stool, or recent sickness though she has been feeling very tired lately. Her cholesterol was abnormal at 38 HDL and 118 LDL. She has been experiencing cold sxs since last Thursday 01/18. Her BP is also high at 150/82 and her pulse is high at 103, but she has taken Sudafed today. She has also been taking Mucinex. She felt feverish last night and could hear herself wheezing. She denies a hx of asthma, but she did have pneumonia 5 years ago. She takes Prilosec daily, and finds when she does not take it she feels heartburn and nausea. Patient Active Problem List   Diagnosis Code    Diabetes ([facility] E11.9    GERD (gastroesophageal reflux disease) K21.9    High cholesterol E78.00    Plantar fasciitis M72.2    S/P bariatric surgery Z98.84        Current Outpatient Prescriptions on File Prior to Visit   Medication Sig Dispense Refill    SUMAtriptan (IMITREX) 100 mg tablet Take 1 tab at HA onset. May repeat x 1 dose in 2 hours if HA not resolved. Max dose is 200 mg in 24 hours. Indications: Migraine 10 Tab 2    omeprazole (PRILOSEC) 20 mg capsule TAKE ONE CAPSULE BY MOUTH TWICE DAILY 180 Cap 3    metFORMIN ER (GLUCOPHAGE XR) 500 mg tablet Take 1 Tab by mouth daily (with dinner). 30 Tab 11     No current facility-administered medications on file prior to visit.         Allergies   Allergen Reactions    Pcn [Penicillins] Hives       Past Medical History:   Diagnosis Date    Diabetes ([facility]     GERD (gastroesophageal reflux disease)  High cholesterol     Plantar fasciitis     bilateral feet    S/P laparoscopic sleeve gastrectomy        Past Surgical History:   Procedure Laterality Date    HX CHOLECYSTECTOMY  1998    HX TONSILLECTOMY         Family History   Problem Relation Age of Onset    Other Mother      septic    Diabetes Mother     Hypertension Mother     Heart Disease Mother     Diabetes Father     Heart Disease Father     Hypertension Father     Other Father      PVD    Cancer Paternal Aunt      OVARIAN    Cancer Paternal Aunt      COLON       Social History     Social History    Marital status: SINGLE     Spouse name: N/A    Number of children: N/A    Years of education: N/A     Occupational History    Not on file. Social History Main Topics    Smoking status: Never Smoker    Smokeless tobacco: Never Used    Alcohol use No    Drug use: Not on file    Sexual activity: Not on file     Other Topics Concern    Not on file     Social History Narrative    Habits: Does not smoke or drink. Does not do drugs.          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 She has two children, 21 and 17. Patient is .  is unfortunately incarcerated. The children are with her.         Family History: Father is 61 with diabetes and hypertension. Mother  at age 64 with two heart attacks, diabetes, and hypertension. Reason for death however was sepsis and multifocal pneumonia. She has five siblings, alive and well.        Office Visit on 2018   Component Date Value Ref Range Status    Cholesterol, total 2018 168  100 - 199 mg/dL Final    Triglyceride 2018 58  0 - 149 mg/dL Final    HDL Cholesterol 2018 38* >39 mg/dL Final    VLDL, calculated 2018 12  5 - 40 mg/dL Final    LDL, calculated 2018 118* 0 - 99 mg/dL Final    TSH 2018 1.780  0.450 - 4.500 uIU/mL Final    WBC 2018 9.2  3.4 - 10.8 x10E3/uL Final    RBC 01/17/2018 4.65  3.77 - 5.28 x10E6/uL Final    HGB 01/17/2018 9.7* 11.1 - 15.9 g/dL Final    HCT 01/17/2018 32.4* 34.0 - 46.6 % Final    MCV 01/17/2018 70* 79 - 97 fL Final    MCH 01/17/2018 20.9* 26.6 - 33.0 pg Final    MCHC 01/17/2018 29.9* 31.5 - 35.7 g/dL Final    RDW 01/17/2018 18.5* 12.3 - 15.4 % Final    PLATELET 00/63/2761 473  150 - 379 x10E3/uL Final    Hemoglobin A1c 01/17/2018 6.9* 4.8 - 5.6 % Final    Estimated average glucose 01/17/2018 151  mg/dL Final    Glucose 01/17/2018 125* 65 - 99 mg/dL Final    BUN 01/17/2018 10  6 - 24 mg/dL Final    Creatinine 01/17/2018 0.70  0.57 - 1.00 mg/dL Final    GFR est non-AA 01/17/2018 108  >59 mL/min/1.73 Final    GFR est AA 01/17/2018 124  >59 mL/min/1.73 Final    BUN/Creatinine ratio 01/17/2018 14  9 - 23 Final    Sodium 01/17/2018 139  134 - 144 mmol/L Final    Potassium 01/17/2018 4.2  3.5 - 5.2 mmol/L Final    Chloride 01/17/2018 104  96 - 106 mmol/L Final    CO2 01/17/2018 21  18 - 29 mmol/L Final    Calcium 01/17/2018 8.8  8.7 - 10.2 mg/dL Final    Protein, total 01/17/2018 6.8  6.0 - 8.5 g/dL Final    Albumin 01/17/2018 3.7  3.5 - 5.5 g/dL Final    GLOBULIN, TOTAL 01/17/2018 3.1  1.5 - 4.5 g/dL Final    A-G Ratio 01/17/2018 1.2  1.2 - 2.2 Final    Bilirubin, total 01/17/2018 0.4  0.0 - 1.2 mg/dL Final    Alk. phosphatase 01/17/2018 86  39 - 117 IU/L Final    AST (SGOT) 01/17/2018 10  0 - 40 IU/L Final    ALT (SGPT) 01/17/2018 8  0 - 32 IU/L Final    Vitamin B12 01/17/2018 385  232 - 1245 pg/mL Final    VITAMIN D, 25-HYDROXY 01/17/2018 18.4* 30.0 - 100.0 ng/mL Final       Review of Systems   Constitutional: Positive for malaise/fatigue. HENT: Positive for congestion. Respiratory: Positive for cough and wheezing. Negative for shortness of breath. Musculoskeletal: Negative for joint pain and myalgias. Neurological: Negative for weakness.      Visit Vitals    /82 (BP 1 Location: Right arm, BP Patient Position: Sitting)    Pulse (!) 103    Temp 99.4 °F (37.4 °C) (Oral)    Resp 16    Ht 5' 3\" (1.6 m)    Wt 260 lb 12.8 oz (118.3 kg)    SpO2 98%    BMI 46.2 kg/m2       Physical Exam   Constitutional: She is oriented to person, place, and time. She appears well-developed. No distress. Morbidly obese   HENT:   Right Ear: External ear normal.   Left Ear: External ear normal.   Eyes: Conjunctivae and EOM are normal. Right eye exhibits no discharge. Left eye exhibits no discharge. Neck: Normal range of motion. Neck supple. Cardiovascular: Normal rate and regular rhythm. Pulmonary/Chest: Effort normal. She has wheezes. Coarse rhonchi, expiratory wheezing   Abdominal: Soft. Bowel sounds are normal. There is no tenderness. Lymphadenopathy:     She has no cervical adenopathy. Neurological: She is alert and oriented to person, place, and time. Skin: She is not diaphoretic. Psychiatric: She has a normal mood and affect. Her behavior is normal.   Nursing note and vitals reviewed. ASSESSMENT and PLAN    ICD-10-CM ICD-9-CM    1. Bronchitis, acute, with bronchospasm J20.9 466.0 azithromycin (ZITHROMAX) 250 mg tablet      methylPREDNISolone (MEDROL DOSEPACK) 4 mg tablet   2. Wheezing on auscultation R06.2 786.07 albuterol (PROVENTIL HFA) 90 mcg/actuation inhaler    Z-pack and Medrol dosepak given. Albuterol inhaler given for wheezing. She has Tessalon from another physician which she can continue to take. 3. Elevated blood pressure reading without diagnosis of hypertension R03.0 796.2 She has taken Sudafed today. Recommended checking blood pressure readings regularly. 4. Other iron deficiency anemia D50.8 280.8 OCCULT BLOOD, IMMUNOASSAY (FIT)    FIT test ordered. RTC in 6 weeks to recheck Hb. Recommended 1000 iu daily vitamin D. This plan was reviewed with the patient and patient agrees. All questions were answered.     This scribe documentation was reviewed by me and accurately reflects the examination and decisions made by me. This note will not be viewable in [address]

## 2018-01-23 NOTE — MR AVS SNAPSHOT
[address] [facility] [address] 
[phone] Patient: Radha Zaldivar MRN: [id number] OAN:[birth year] Visit Information Date & Time Provider Department Dept. Phone Encounter #  
 1/23/2018 12:30 PM Marko Macario, [address] [facility],[address] Internal Medicine [phone] [id number] Upcoming Health Maintenance Date Due  
 EYE EXAM RETINAL OR DILATED Q1 5/29/1986 Pneumococcal 19-64 Medium Risk (1 of 1 - PPSV23) 5/29/1995 FOOT EXAM Q1 4/25/2018 MICROALBUMIN Q1 4/25/2018 HEMOGLOBIN A1C Q6M 7/17/2018 LIPID PANEL Q1 1/17/2019 PAP AKA CERVICAL CYTOLOGY 11/14/2020 DTaP/Tdap/Td series (2 - Td) 1/16/2028 Allergies as of 1/23/2018  Review Complete On: 1/23/2018 By: Zulema Maxwell LPN Severity Noted Reaction Type Reactions Pcn [Penicillins]  04/23/2013    Hives Current Immunizations  Reviewed on 5/7/2013 Name Date Influenza Vaccine  Deferred (Patient Refused) Pneumococcal Polysaccharide (PPSV-23)  Deferred (Patient Refused) Not reviewed this visit You Were Diagnosed With   
  
 Codes Comments Bronchitis, acute, with bronchospasm    -  Primary ICD-10-CM: J20.9 ICD-9-CM: 466.0 Wheezing on auscultation     ICD-10-CM: R06.2 ICD-9-CM: 786.07 Elevated blood pressure reading without diagnosis of hypertension     ICD-10-CM: R03.0 ICD-9-CM: 796.2 Other iron deficiency anemia     ICD-10-CM: D50.8 ICD-9-CM: 280.8 Vitals BP Pulse Temp Resp Height(growth percentile) Weight(growth percentile) 150/82 (BP 1 Location: Right arm, BP Patient Position: Sitting) (!) 103 99.4 °F (37.4 °C) (Oral) 16 5' 3\" (1.6 m) 260 lb 12.8 oz (118.3 kg) SpO2 BMI OB Status Smoking Status 98% 46.2 kg/m2 IUD Never Smoker BMI and BSA Data Body Mass Index Body Surface Area  
 46.2 kg/m 2 2.29 m 2 Preferred Pharmacy Pharmacy Name Phone [facility] [phone] Your Updated Medication List  
  
   
This list is accurate as of: 1/23/18  1:25 PM.  Always use your most recent med list.  
  
  
  
  
 albuterol 90 mcg/actuation inhaler Commonly known as:  PROVENTIL HFA Take 1 Puff by inhalation every six (6) hours as needed for Wheezing for up to 30 days. azithromycin 250 mg tablet Commonly known as:  Leny Gill Take 1 Tab by mouth See Admin Instructions for 5 days. metFORMIN  mg tablet Commonly known as:  GLUCOPHAGE XR Take 1 Tab by mouth daily (with dinner). methylPREDNISolone 4 mg tablet Commonly known as:  Mardell Layman As directed. omeprazole 20 mg capsule Commonly known as:  PRILOSEC  
TAKE ONE CAPSULE BY MOUTH TWICE DAILY  
  
 SUMAtriptan 100 mg tablet Commonly known as:  IMITREX Take 1 tab at HA onset. May repeat x 1 dose in 2 hours if HA not resolved. Max dose is 200 mg in 24 hours. Indications: Migraine Prescriptions Sent to Pharmacy Refills  
 albuterol (PROVENTIL HFA) 90 mcg/actuation inhaler 0 Sig: Take 1 Puff by inhalation every six (6) hours as needed for Wheezing for up to 30 days. Class: Normal  
 Pharmacy: [address] [place] [address] Ph #: [phone] Route: Inhalation  
 azithromycin (ZITHROMAX) 250 mg tablet 0 Sig: Take 1 Tab by mouth See Admin Instructions for 5 days. Class: Normal  
 Pharmacy: [address] [place] [address] Ph #: [phone] Route: Oral  
 methylPREDNISolone (MEDROL DOSEPACK) 4 mg tablet 0 Sig: As directed. Class: Normal  
 Pharmacy: [address] [place] [address] Ph #: [phone] We Performed the Following OCCULT BLOOD, IMMUNOASSAY (FIT) [id number] CPT(R)] Introducing [facility] & HEALTH SERVICES! Dear Gonzalo Nowak: Thank you for requesting a Jade Magnet account. Our records indicate that you already have an active Jade Magnet account. You can access your account anytime at [email or web address] Did you know that you can access your hospital and ER discharge instructions at any time in Jade Magnet? You can also review all of your test results from your hospital stay or ER visit. Additional Information If you have questions, please visit the Frequently Asked Questions section of the Jade Magnet website at [email or web address] Remember, Jade Magnet is NOT to be used for urgent needs. For medical emergencies, dial 911. Now available from your iPhone and Android! Please provide this summary of care documentation to your next provider. Your primary care clinician is listed as [address]. If you have any questions after today's visit, please call [phone].

## 2018-01-23 NOTE — TELEPHONE ENCOUNTER
Insurance stated would not approve the proventil but would cover proair, gave verbal ok to change per MD approval.  No other concerns.

## 2018-02-16 LAB — HEMOCCULT STL QL IA: NEGATIVE

## 2018-06-12 ENCOUNTER — OFFICE VISIT (OUTPATIENT)
Dept: INTERNAL MEDICINE CLINIC | Age: 43
End: 2018-06-12

## 2018-06-12 VITALS
RESPIRATION RATE: 16 BRPM | HEIGHT: 63 IN | OXYGEN SATURATION: 98 % | WEIGHT: 259.2 LBS | BODY MASS INDEX: 45.93 KG/M2 | SYSTOLIC BLOOD PRESSURE: 122 MMHG | HEART RATE: 82 BPM | DIASTOLIC BLOOD PRESSURE: 76 MMHG | TEMPERATURE: 98.7 F

## 2018-06-12 DIAGNOSIS — Z98.84 S/P BARIATRIC SURGERY: ICD-10-CM

## 2018-06-12 DIAGNOSIS — E66.01 OBESITY, MORBID (HCC): ICD-10-CM

## 2018-06-12 DIAGNOSIS — M54.6 ACUTE LEFT-SIDED THORACIC BACK PAIN: Primary | ICD-10-CM

## 2018-06-12 RX ORDER — CYCLOBENZAPRINE HCL 10 MG
10 TABLET ORAL
Qty: 30 TAB | Refills: 1 | Status: SHIPPED | OUTPATIENT
Start: 2018-06-12

## 2018-06-12 NOTE — PROGRESS NOTES
This note will not be viewable in [address] Kenyatta Abdi is a  44 y.o. female presents for visit. Acute back pain     Chief Complaint   Patient presents with    Back Pain     states that back pain started last wednesday woke with the pain. states does not think she did anything to hurt it.  midline to the left.  pain increases with certain movements and started to notice back spasms. HPI Comments: Patient reports she has been exercising on the [employer] machine regularly for the past couple of months in order to lose weight. She is not aware of injuring herself at one specific time however she thinks it is a possibility. She states that she has a baseline achiness with exacerbations of a spasm. She is status post bariatric surgery and unable to take NSAIDs. 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 Back Pain    The history is provided by the patient. This is a new problem. The current episode started more than 1 week ago. The problem has been gradually improving. The problem occurs constantly. The pain is associated with excercise. The pain is present in the thoracic spine and left side. The quality of the pain is described as aching. The pain does not radiate. The pain is moderate. The symptoms are aggravated by bending, twisting and certain positions. The pain is worse during the night. Pertinent negatives include no fever, no numbness, no weight loss, no headaches, no abdominal pain, no abdominal swelling, no bowel incontinence, no perianal numbness, no bladder incontinence, no dysuria, no pelvic pain, no leg pain, no paresthesias, no paresis, no tingling and no weakness. She has tried nothing for the symptoms. Risk factors include obesity. Review of Systems   Constitutional: Negative for fever and weight loss. Gastrointestinal: Negative for abdominal pain and bowel incontinence.    Genitourinary: Negative for bladder incontinence, dysuria and pelvic pain. Musculoskeletal: Positive for back pain. Neurological: Negative for tingling, weakness, numbness, headaches and paresthesias. Visit Vitals    /76 (BP 1 Location: Left arm, BP Patient Position: Sitting)    Pulse 82    Temp 98.7 °F (37.1 °C) (Oral)    Resp 16    Ht 5' 3\" (1.6 m)    Wt 259 lb 3.2 oz (117.6 kg)    SpO2 98%    BMI 45.92 kg/m2     Physical Exam   Constitutional: She is oriented to person, place, and time. She appears well-developed and well-nourished. No distress. Obese   HENT:   Head: Normocephalic and atraumatic. Right Ear: External ear normal.   Left Ear: External ear normal.   Eyes: Conjunctivae are normal.   Cardiovascular: Normal rate, regular rhythm and normal heart sounds. Pulmonary/Chest: Effort normal and breath sounds normal. She has no wheezes. Musculoskeletal: She exhibits tenderness. She exhibits no edema. Thoracic back: She exhibits pain. Back:    Neurological: She is alert and oriented to person, place, and time. Skin: Skin is warm and dry. Psychiatric: She has a normal mood and affect. Her behavior is normal.   Nursing note and vitals reviewed. Patient Active Problem List    Diagnosis Date Noted    Obesity, morbid ([facility] 06/12/2018    S/P bariatric surgery 01/16/2018    Diabetes ([facility]     GERD (gastroesophageal reflux disease)     High cholesterol     Plantar fasciitis          ASSESSMENT AND PLAN:      ICD-10-CM ICD-9-CM   1. Acute left-sided thoracic back pain M54.6 724.1   2. Obesity, morbid ([facility] E66.01 278.01   3. S/P bariatric surgery Z98.84 V45.86     Orders Placed This Encounter    cyclobenzaprine (FLEXERIL) 10 mg tablet     Sig: Take 1 Tab by mouth three (3) times daily as needed for Muscle Spasm(s). Indications: Muscle Spasm     Dispense:  30 Tab     Refill:  1     Diagnoses and all orders for this visit:    1.  Acute left-sided thoracic back pain  - cyclobenzaprine (FLEXERIL) 10 mg tablet; Take 1 Tab by mouth three (3) times daily as needed for Muscle Spasm(s). Indications: Muscle Spasm    2. Obesity, morbid ([address]       Discussed the patient's BMI with her. The BMI follow up plan is as follows:      -dietary management education, guidance, and counseling   -encourage exercise   -monitor weight     3. S/P bariatric surgery              -Follow healthy eating  her guidelines. Continue to work on weight loss. I discussed starting with conservative measures and resting her back. If no improvement with heat and Flexeril as needed will consider ordering an x-ray and/or sending patient to physical therapy. Follow-up Disposition:  Return in about 1 week (around 6/19/2018), or if symptoms worsen or fail to improve. Disclaimer:  Advised her to call back or return to office if symptoms worsen/change/persist.  Discussed expected course/resolution/complications of diagnosis in detail with patient. Medication risks/benefits/alternatives discussed with patient. She was given an after visit summary which includes diagnoses, current medications, & vitals. Discussed patient instructions and advised to read to all patient instructions regarding care. She expressed understanding with the diagnosis and plan.

## 2018-06-12 NOTE — PROGRESS NOTES
Chief Complaint   Patient presents with    Back Pain     states that back pain started last wednesday woke with the pain. states does not think she did anything to hurt it.  midline to the left.  pain increases with certain movements and started to notice back spasms.

## 2018-09-24 RX ORDER — OMEPRAZOLE 20 MG/1
CAPSULE, DELAYED RELEASE ORAL
Qty: 60 CAP | Refills: 11 | Status: SHIPPED | OUTPATIENT
Start: 2018-09-24

## 2021-03-10 ENCOUNTER — OFFICE VISIT (OUTPATIENT)
Dept: SURGERY | Age: 46
End: 2021-03-10
Payer: COMMERCIAL

## 2021-03-10 VITALS
SYSTOLIC BLOOD PRESSURE: 121 MMHG | BODY MASS INDEX: 47.52 KG/M2 | HEART RATE: 92 BPM | WEIGHT: 268.2 LBS | TEMPERATURE: 98.3 F | HEIGHT: 63 IN | RESPIRATION RATE: 18 BRPM | OXYGEN SATURATION: 97 % | DIASTOLIC BLOOD PRESSURE: 79 MMHG

## 2021-03-10 DIAGNOSIS — S70.12XA HEMATOMA OF LEFT THIGH, INITIAL ENCOUNTER: Primary | ICD-10-CM

## 2021-03-10 PROBLEM — T14.8XXA HEMATOMA: Status: ACTIVE | Noted: 2021-03-10

## 2021-03-10 PROCEDURE — 99203 OFFICE O/P NEW LOW 30 MIN: CPT | Performed by: SURGERY

## 2021-03-10 NOTE — PROGRESS NOTES
1. Have you been to the ER, urgent care clinic since your last visit? Hospitalized since your last visit? No  2. Have you seen or consulted any other health care providers outside of the [address] [place] [address] since your last visit? Include any pap smears or colon screening.  No
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
## 2022-03-18 PROBLEM — E66.01 OBESITY, MORBID (HCC): Status: ACTIVE | Noted: 2018-06-12

## 2022-03-18 PROBLEM — T14.8XXA HEMATOMA: Status: ACTIVE | Noted: 2021-03-10

## 2022-03-19 PROBLEM — Z98.84 S/P BARIATRIC SURGERY: Status: ACTIVE | Noted: 2018-01-16

## 2022-11-04 NOTE — PROGRESS NOTES
Chief Complaint   Patient presents with    Complete Physical     not fasting but needs labs, no pap, states she is concerned because she just doesnt feel well and does not feel like she has the attention span, feels stiff and moving in slow motion. Daughter calling and Dayana almost out of ketoconazole cream.  Marion leaving town tomorrow and hoping to get before she leaves.  Advised I could fill and did send.  Advised she can pick-up in a bit.  Flaquita King RN    Prescription approved per [facility] Refill Protocol.    Flaquita King RN

## 2022-11-09 ENCOUNTER — TRANSCRIBE ORDER (OUTPATIENT)
Dept: SCHEDULING | Age: 47
End: 2022-11-09

## 2022-11-09 DIAGNOSIS — Z12.31 VISIT FOR SCREENING MAMMOGRAM: Primary | ICD-10-CM

## 2023-02-25 ENCOUNTER — HOSPITAL ENCOUNTER (OUTPATIENT)
Dept: MAMMOGRAPHY | Age: 48
End: 2023-02-25
Payer: COMMERCIAL

## 2023-02-25 DIAGNOSIS — Z12.31 VISIT FOR SCREENING MAMMOGRAM: ICD-10-CM

## 2023-02-25 PROCEDURE — 77063 BREAST TOMOSYNTHESIS BI: CPT

## 2023-09-28 ENCOUNTER — OFFICE VISIT (OUTPATIENT)
Age: 48
End: 2023-09-28

## 2023-09-28 VITALS
WEIGHT: 213 LBS | BODY MASS INDEX: 37.74 KG/M2 | HEART RATE: 76 BPM | OXYGEN SATURATION: 98 % | RESPIRATION RATE: 16 BRPM | SYSTOLIC BLOOD PRESSURE: 104 MMHG | HEIGHT: 63 IN | DIASTOLIC BLOOD PRESSURE: 70 MMHG

## 2023-09-28 DIAGNOSIS — Z98.84 S/P BARIATRIC SURGERY: ICD-10-CM

## 2023-09-28 DIAGNOSIS — Z01.818 PRE-OPERATIVE CLEARANCE: ICD-10-CM

## 2023-09-28 DIAGNOSIS — E11.29 TYPE 2 DIABETES MELLITUS WITH MICROALBUMINURIA, WITHOUT LONG-TERM CURRENT USE OF INSULIN (HCC): ICD-10-CM

## 2023-09-28 DIAGNOSIS — R94.31 EKG ABNORMALITIES: Primary | ICD-10-CM

## 2023-09-28 DIAGNOSIS — R80.9 TYPE 2 DIABETES MELLITUS WITH MICROALBUMINURIA, WITHOUT LONG-TERM CURRENT USE OF INSULIN (HCC): ICD-10-CM

## 2023-09-28 RX ORDER — ERGOCALCIFEROL 1.25 MG/1
1 CAPSULE ORAL WEEKLY
COMMUNITY
Start: 2021-03-15

## 2023-09-28 RX ORDER — SEMAGLUTIDE 2.68 MG/ML
INJECTION, SOLUTION SUBCUTANEOUS
COMMUNITY
Start: 2023-09-06

## 2023-09-28 RX ORDER — LOSARTAN POTASSIUM 25 MG/1
25 TABLET ORAL DAILY
COMMUNITY
Start: 2023-07-03

## 2023-09-28 RX ORDER — ATORVASTATIN CALCIUM 10 MG/1
10 TABLET, FILM COATED ORAL DAILY
COMMUNITY
Start: 2023-07-03

## 2023-09-28 NOTE — PROGRESS NOTES
Faxed office note and ekg from today for patient's clearance to [address] fax # [phone]

## 2023-09-28 NOTE — PROGRESS NOTES
HISTORY OF PRESENT ILLNESS  Roxan Krabbe is a 53 y.o. female   She has history of diabetes with proteinuria. She had bariatric surgery 10 years ago and now needs to have a tummy tuck operation done by a physician in [facility]. Her hemoglobin A1c is 6.1. She does not have hypertension but takes losartan for previous proteinuria which resolved. She does not smoke cigarettes or drink alcohol. She works with a  3 times a week and walks between 2 and 3 miles on the other days without chest pain or shortness of breath. Her EKG shows minimal nonspecific ST-T abnormalities. HPI     Specialty Problems          Cardiology Problems    High cholesterol          Current Outpatient Medications   Medication Instructions    atorvastatin (LIPITOR) 10 mg, Oral, DAILY    cyclobenzaprine (FLEXERIL) 10 mg, 3 TIMES DAILY PRN    losartan (COZAAR) 25 mg, Oral, DAILY    metFORMIN (GLUCOPHAGE-XR) 500 mg, Oral, DAILY WITH DINNER    methylPREDNISolone (MEDROL DOSEPACK) 4 MG tablet As directed. omeprazole (PRILOSEC) 20 MG delayed release capsule 1 capsule, Oral, 2 TIMES DAILY    OZEMPIC, 2 MG/DOSE, 8 MG/3ML SOPN INJECT 2 MG SUBCUTANEOUSLY ONCE A WEEK AS DIRECTED    SUMAtriptan (IMITREX) 100 MG tablet Take 1 tab at HA onset. May repeat x 1 dose in 2 hours if HA not resolved. Max dose is 200 mg in 24 hours.   Indications: Migraine    vitamin D (ERGOCALCIFEROL) 1.25 MG (07649 UT) CAPS capsule 1 capsule, Oral, WEEKLY      Allergies   Allergen Reactions    Penicillins Hives     Past Medical History:   Diagnosis Date    Diabetes ([address])     GERD (gastroesophageal reflux disease)     High cholesterol     Plantar fasciitis     bilateral feet    S/P laparoscopic sleeve gastrectomy 2013     Past Surgical History:   Procedure Laterality Date    BREAST REDUCTION SURGERY Bilateral     2017    CHOLECYSTECTOMY  01/01/1998    TONSILLECTOMY  01/01/2005     Family History   Problem Relation Age of Onset    Heart Disease Mother

## 2024-02-22 ENCOUNTER — HOSPITAL ENCOUNTER (OUTPATIENT)
Facility: HOSPITAL | Age: 49
Setting detail: RECURRING SERIES
Discharge: HOME | End: 2024-02-25
Payer: COMMERCIAL

## 2024-02-22 PROCEDURE — 97161 PT EVAL LOW COMPLEX 20 MIN: CPT

## 2024-02-22 PROCEDURE — 97140 MANUAL THERAPY 1/> REGIONS: CPT
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
## 2024-02-26 ENCOUNTER — HOSPITAL ENCOUNTER (OUTPATIENT)
Facility: HOSPITAL | Age: 49
Setting detail: RECURRING SERIES
Discharge: HOME | End: 2024-02-29
Payer: COMMERCIAL

## 2024-02-26 PROCEDURE — 20560 NDL INSJ W/O NJX 1 OR 2 MUSC: CPT

## 2024-02-26 PROCEDURE — 97140 MANUAL THERAPY 1/> REGIONS: CPT

## 2024-02-26 NOTE — PROGRESS NOTES
PHYSICAL THERAPY - MEDICARE DAILY TREATMENT NOTE (updated 3/23)      Date: 2024          Patient Name:  Lilliana Guerrero :  1975   Medical   Diagnosis:  Neck pain [M54.2] Treatment Diagnosis:  M54.2  NECK PAIN, M54.59  OTHER LOWER BACK PAIN, and M54.6  THORACIC PAIN    Referral Source:  Elle Lopez PA Insurance:   Payor: AETNA / Plan: AETNA - OPEN ACCESS (HMO) / Product Type: *No Product type* /                     Patient  verified yes     Visit #   Current  / Total 2 24   Time   In / Out 1010 A 1040 A   Total Treatment Time 30   Total Timed Codes 10   1:1 Treatment Time 10      [facility] Totals Reminder:  bill using total billable   min of TIMED therapeutic procedures and modalities.   8-22 min = 1 unit; 23-37 min = 2 units; 38-52 min = 3 units; 53-67 min = 4 units; 68-82 min = 5 units        SUBJECTIVE    Pain Level (0-10 scale): 2    Any medication changes, allergies to medications, adverse drug reactions, diagnosis change, or new procedure performed?: [x] No    [] Yes (see summary sheet for update)  Medications: Verified on Patient Summary List    Subjective functional status/changes:     Most of pain today is neck and mid back    OBJECTIVE    Therapeutic Procedures:  Tx Min Billable or 1:1 Min (if diff from Tx Min) Procedure, Rationale, Specifics   -  57418 Therapeutic Exercise (timed):  increase ROM, strength, coordination, balance, and proprioception to improve patient's ability to progress to PLOF and address remaining functional goals. (see flow sheet as applicable)     Details if applicable:     10  90082 Manual Therapy (timed):  decrease pain and increase tissue extensibility to improve patient's ability to progress to PLOF and address remaining functional goals.  The manual therapy interventions were performed at a separate and distinct time from the therapeutic activities interventions . (see flow sheet as applicable)     Details if applicable:  CPA and UPA T1-T9 grade III-IV.  Thoracic

## 2024-02-28 ENCOUNTER — HOSPITAL ENCOUNTER (OUTPATIENT)
Facility: HOSPITAL | Age: 49
Setting detail: RECURRING SERIES
Discharge: HOME | End: 2024-03-02
Payer: COMMERCIAL

## 2024-02-28 PROCEDURE — 97110 THERAPEUTIC EXERCISES: CPT

## 2024-02-28 NOTE — PROGRESS NOTES
PHYSICAL THERAPY - MEDICARE DAILY TREATMENT NOTE (updated 3/23)      Date: 2024          Patient Name:  Lilliana Guerrero :  1975   Medical   Diagnosis:  Neck pain [M54.2] Treatment Diagnosis:  M54.2  NECK PAIN, M54.59  OTHER LOWER BACK PAIN, and M54.6  THORACIC PAIN    Referral Source:  Elle Lopez PA Insurance:   Payor: AETNA / Plan: AETNA - OPEN ACCESS (HMO) / Product Type: *No Product type* /                     Patient  verified yes     Visit #   Current  / Total 3 24   Time   In / Out 7:05 A 8:05 A   Total Treatment Time 60   Total Timed Codes 50   1:1 Treatment Time 50      [facility] Totals Reminder:  bill using total billable   min of TIMED therapeutic procedures and modalities.   8-22 min = 1 unit; 23-37 min = 2 units; 38-52 min = 3 units; 53-67 min = 4 units; 68-82 min = 5 units        SUBJECTIVE    Pain Level (0-10 scale): 2    Any medication changes, allergies to medications, adverse drug reactions, diagnosis change, or new procedure performed?: [x] No    [] Yes (see summary sheet for update)  Medications: Verified on Patient Summary List    Subjective functional status/changes:     Patient states \"the jury is still out on the dry needling but my low back is doing ok.\" States she feels her core is very weak after recent abdominoplasty     OBJECTIVE    Therapeutic Procedures:  Tx Min Billable or 1:1 Min (if diff from Tx Min) Procedure, Rationale, Specifics   50  00785 Therapeutic Exercise (timed):  increase ROM, strength, coordination, balance, and proprioception to improve patient's ability to progress to PLOF and address remaining functional goals. (see flow sheet as applicable)     Details if applicable:  established HEP, progressed per flow sheet     20476 Manual Therapy (timed):  decrease pain and increase tissue extensibility to improve patient's ability to progress to PLOF and address remaining functional goals.  The manual therapy interventions were performed at a separate and distinct

## 2024-03-05 ENCOUNTER — HOSPITAL ENCOUNTER (OUTPATIENT)
Facility: HOSPITAL | Age: 49
Setting detail: RECURRING SERIES
Discharge: HOME | End: 2024-03-08
Payer: COMMERCIAL

## 2024-03-05 PROCEDURE — 97110 THERAPEUTIC EXERCISES: CPT

## 2024-03-05 NOTE — PROGRESS NOTES
PHYSICAL THERAPY - MEDICARE DAILY TREATMENT NOTE (updated 3/23)      Date: 3/5/2024          Patient Name:  Lilliana Guerrero :  1975   Medical   Diagnosis:  Neck pain [M54.2] Treatment Diagnosis:  M54.2  NECK PAIN, M54.59  OTHER LOWER BACK PAIN, and M54.6  THORACIC PAIN    Referral Source:  Elle Lopez PA Insurance:   Payor: AETNA / Plan: AETNA - OPEN ACCESS (HMO) / Product Type: *No Product type* /                     Patient  verified yes     Visit #   Current  / Total 4 24   Time   In / Out 7:10 A 8:00 A   Total Treatment Time 50   Total Timed Codes 40   1:1 Treatment Time 30      [facility] Totals Reminder:  bill using total billable   min of TIMED therapeutic procedures and modalities.   8-22 min = 1 unit; 23-37 min = 2 units; 38-52 min = 3 units; 53-67 min = 4 units; 68-82 min = 5 units          SUBJECTIVE    Pain Level (0-10 scale): 2    Any medication changes, allergies to medications, adverse drug reactions, diagnosis change, or new procedure performed?: [x] No    [] Yes (see summary sheet for update)  Medications: Verified on Patient Summary List    Subjective functional status/changes:     Patient states she woke up stiff this morning and did some stretches that helped.     OBJECTIVE    Therapeutic Procedures:  Tx Min Billable or 1:1 Min (if diff from Tx Min) Procedure, Rationale, Specifics   40 30 60529 Therapeutic Exercise (timed):  increase ROM, strength, coordination, balance, and proprioception to improve patient's ability to progress to PLOF and address remaining functional goals. (see flow sheet as applicable)     Details if applicable:  established HEP, progressed per flow sheet     07398 Manual Therapy (timed):  decrease pain and increase tissue extensibility to improve patient's ability to progress to PLOF and address remaining functional goals.  The manual therapy interventions were performed at a separate and distinct time from the therapeutic activities interventions . (see flow sheet

## 2024-03-07 ENCOUNTER — HOSPITAL ENCOUNTER (OUTPATIENT)
Facility: HOSPITAL | Age: 49
Setting detail: RECURRING SERIES
Discharge: HOME | End: 2024-03-10
Payer: COMMERCIAL

## 2024-03-07 PROCEDURE — 97140 MANUAL THERAPY 1/> REGIONS: CPT

## 2024-03-07 PROCEDURE — 97110 THERAPEUTIC EXERCISES: CPT

## 2024-03-07 NOTE — PROGRESS NOTES
PHYSICAL THERAPY - MEDICARE DAILY TREATMENT NOTE (updated 3/23)      Date: 3/7/2024          Patient Name:  Lilliana Guerrero :  1975   Medical   Diagnosis:  Neck pain [M54.2] Treatment Diagnosis:  M54.2  NECK PAIN, M54.59  OTHER LOWER BACK PAIN, and M54.6  THORACIC PAIN    Referral Source:  Elle Lopez PA Insurance:   Payor: AETNA / Plan: AETNA - OPEN ACCESS (HMO) / Product Type: *No Product type* /                     Patient  verified yes     Visit #   Current  / Total 5 24   Time   In / Out 1205 P 100 P   Total Treatment Time 55   Total Timed Codes 45   1:1 Treatment Time 30      [facility] Totals Reminder:  bill using total billable   min of TIMED therapeutic procedures and modalities.   8-22 min = 1 unit; 23-37 min = 2 units; 38-52 min = 3 units; 53-67 min = 4 units; 68-82 min = 5 units          SUBJECTIVE    Pain Level (0-10 scale): 2    Any medication changes, allergies to medications, adverse drug reactions, diagnosis change, or new procedure performed?: [x] No    [] Yes (see summary sheet for update)  Medications: Verified on Patient Summary List    Subjective functional status/changes:       About 10-20% better since since start of therapy.      OBJECTIVE    Therapeutic Procedures:  Tx Min Billable or 1:1 Min (if diff from Tx Min) Procedure, Rationale, Specifics   30  75433 Therapeutic Exercise (timed):  increase ROM, strength, coordination, balance, and proprioception to improve patient's ability to progress to PLOF and address remaining functional goals. (see flow sheet as applicable)   Details if applicable:       15  76725 Manual Therapy (timed):  decrease pain and increase tissue extensibility to improve patient's ability to progress to PLOF and address remaining functional goals.  The manual therapy interventions were performed at a separate and distinct time from the therapeutic activities interventions . (see flow sheet as applicable)     Details if applicable:  CPA and UPA T1-T9 grade

## 2024-03-14 ENCOUNTER — HOSPITAL ENCOUNTER (OUTPATIENT)
Facility: HOSPITAL | Age: 49
Setting detail: RECURRING SERIES
Discharge: HOME | End: 2024-03-17
Payer: COMMERCIAL

## 2024-03-14 PROCEDURE — 97110 THERAPEUTIC EXERCISES: CPT

## 2024-03-14 NOTE — PROGRESS NOTES
PHYSICAL THERAPY - MEDICARE DAILY TREATMENT NOTE (updated 3/23)      Date: 3/14/2024          Patient Name:  Lilliana Guerrero :  1975   Medical   Diagnosis:  Neck pain [M54.2] Treatment Diagnosis:  M54.2  NECK PAIN, M54.59  OTHER LOWER BACK PAIN, and M54.6  THORACIC PAIN    Referral Source:  Elle Lopez PA Insurance:   Payor: [employer] / Plan: [employer] - OPEN ACCESS (HMO) / Product Type: *No Product type* /                     Patient  verified yes     Visit #   Current  / Total 6 24   Time   In / Out 800 A 855 A   Total Treatment Time 55   Total Timed Codes 45      [facility] Totals Reminder:  bill using total billable   min of TIMED therapeutic procedures and modalities.   8-22 min = 1 unit; 23-37 min = 2 units; 38-52 min = 3 units; 53-67 min = 4 units; 68-82 min = 5 units          SUBJECTIVE    Pain Level (0-10 scale): 2    Any medication changes, allergies to medications, adverse drug reactions, diagnosis change, or new procedure performed?: [x] No    [] Yes (see summary sheet for update)  Medications: Verified on Patient Summary List    Subjective functional status/changes:       Pt thinks she wants to go see a spine specialist for her back since it still hurts.  Most pain in the AM and at night.     OBJECTIVE    Therapeutic Procedures:  Tx Min Billable or 1:1 Min (if diff from Tx Min) Procedure, Rationale, Specifics   45  [id number] Therapeutic Exercise (timed):  increase ROM, strength, coordination, balance, and proprioception to improve patient's ability to progress to PLOF and address remaining functional goals. (see flow sheet as applicable)   Details if applicable:       -  16666 Manual Therapy (timed):  decrease pain and increase tissue extensibility to improve patient's ability to progress to PLOF and address remaining functional goals.  The manual therapy interventions were performed at a separate and distinct time from the therapeutic activities interventions . (see flow sheet as applicable)     Details if

## 2024-03-19 ENCOUNTER — HOSPITAL ENCOUNTER (OUTPATIENT)
Facility: HOSPITAL | Age: 49
Setting detail: RECURRING SERIES
Discharge: HOME | End: 2024-03-22
Payer: COMMERCIAL

## 2024-03-19 PROCEDURE — 97110 THERAPEUTIC EXERCISES: CPT

## 2024-03-19 NOTE — PROGRESS NOTES
of session (0-10 scale): 0      Assessment     Pt continues to slowly progress towards goals.      PLAN  Yes  Continue plan of care    Re-Cert Due: 5/22/24  [x]  Upgrade activities as tolerated  []  Discharge due to :  []  Other:      Elle Coleman, PTA       3/19/2024       7:12 AM

## 2024-11-14 ENCOUNTER — HOSPITAL ENCOUNTER (OUTPATIENT)
Facility: HOSPITAL | Age: 49
Discharge: HOME | End: 2024-11-17
Payer: COMMERCIAL

## 2024-11-14 DIAGNOSIS — Z12.31 VISIT FOR SCREENING MAMMOGRAM: ICD-10-CM

## 2024-11-14 PROCEDURE — 77063 BREAST TOMOSYNTHESIS BI: CPT
